# Patient Record
Sex: FEMALE | Race: BLACK OR AFRICAN AMERICAN | NOT HISPANIC OR LATINO | Employment: UNEMPLOYED | URBAN - METROPOLITAN AREA
[De-identification: names, ages, dates, MRNs, and addresses within clinical notes are randomized per-mention and may not be internally consistent; named-entity substitution may affect disease eponyms.]

---
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
## 2024-02-29 ENCOUNTER — TELEPHONE (OUTPATIENT)
Dept: OPTOMETRY | Facility: CLINIC | Age: 19
End: 2024-02-29

## 2024-02-29 ENCOUNTER — TELEPHONE (OUTPATIENT)
Dept: OPTOMETRY | Facility: CLINIC | Age: 19
End: 2024-02-29
Payer: COMMERCIAL

## 2024-02-29 ENCOUNTER — NURSE TRIAGE (OUTPATIENT)
Dept: NURSING | Facility: CLINIC | Age: 19
End: 2024-02-29

## 2024-02-29 DIAGNOSIS — H10.32 ACUTE CONJUNCTIVITIS OF LEFT EYE, UNSPECIFIED ACUTE CONJUNCTIVITIS TYPE: Primary | ICD-10-CM

## 2024-02-29 PROCEDURE — 99207 PR NO CHARGE LOS: CPT | Performed by: OPTOMETRIST

## 2024-02-29 RX ORDER — POLYMYXIN B SULFATE AND TRIMETHOPRIM 1; 10000 MG/ML; [USP'U]/ML
1 SOLUTION OPHTHALMIC 4 TIMES DAILY
Qty: 10 ML | Refills: 0 | Status: SHIPPED | OUTPATIENT
Start: 2024-02-29

## 2024-02-29 NOTE — TELEPHONE ENCOUNTER
[facility] Call Center    Phone Message    May a detailed message be left on voicemail: yes     Reason for Call: Other: Patient is returning call from the clinic and asking to speak with the care team member that called her.  Best time to reach the patient is 12:30 PM and she asks for them to call her twice.  Best phone number for the patient is [phone].  Thank you!      Action Taken: Message routed to:  Other: [facility]     Travel Screening: Not Applicable

## 2024-02-29 NOTE — TELEPHONE ENCOUNTER
Caller reporting the following red-flag symptom(s): GOOPY EYE , SWOLLEN , RED EYE AND SLIGHT EYE PAIN     Per the system red-flag symptom policy, patient was instructed to:  speak with a Registered Nurse    Action:  Patient warm transferred to a Registered Nurse

## 2024-02-29 NOTE — TELEPHONE ENCOUNTER
Nurse triage encounter - LVM for pt to call us back. May need to see a different provider based on triage. Copy and paste triage call below.     Background:   Last seen on 4/20/22     Optometry/ [facility]/ [facility]. Patient prefers this location.   1. Encounter for examination of eyes and vision without abnormal findings Z01.00    2. Myopia of both eyes H52.13       Assessment: patient with LEFT eye infection/ yellow drainage and photophobia, blurred vision LEFT     Protocol Recommended Disposition:   See in office today   Reviewed with patient: IF NO AVAILABLE APPOINTMENTS:  You  may need to be seen in an Urgent Care Center.   Another choice is to go to the Emergency Department.   Recommendation:  Please advise/ call with appt time. Thank you

## 2024-02-29 NOTE — TELEPHONE ENCOUNTER
"Nurse Triage SBAR    Is this a 2nd Level Triage? YES, LICENSED PRACTITIONER REVIEW IS REQUIRED    Situation: LEFT eye drainage with photophobia and blurred vision  She is having redness LEFt eye, with yellow and gooey discharge   Her vision has changed thusly: notes when she drives she is photophobic and LEFT vision is blurry  -Glasses worn no contacts    Denies URI, fever, others in house with similar symptoms.     Background:  Last seen on 4/20/22    Optometry/ [facility]/ [facility]. Patient prefers this location.  1. Encounter for examination of eyes and vision without abnormal findings  Z01.00     2. Myopia of both eyes  H52.13        Assessment: patient with LEFT eye infection/ yellow drainage and photophobia, blurred vision LEFT    Protocol Recommended Disposition:   See in office today   Reviewed with patient: IF NO AVAILABLE APPOINTMENTS:  You  may need to be seen in an Urgent Care Center.   Another choice is to go to the Emergency Department.  Recommendation:  Please advise/ call with appt time    Routed to provider team> patient preference    Does the patient meet one of the following criteria for ADS visit consideration? No    Reason for Disposition   MODERATE eye pain (e.g., interferes with normal activities)   Looking at light causes MODERATE to SEVERE eye pain (i.e., photophobia)    Additional Information   Negative: Eye exposure to chemical or fumes   Negative: Redness of white of eye (sclera), but no pus or only a small amount of brief pus   Negative: SEVERE pain (e.g., excruciating)   Negative: Patient sounds very sick or weak to the triager    Answer Assessment - Initial Assessment Questions  1. EYE DISCHARGE: \"Is the discharge in one or both eyes?\" \"What color is it?\" \"How much is there?\" \"When did the discharge start?\"     LEFT, yellow, thick  2. REDNESS OF SCLERA: \"Is the redness in one or both eyes?\" \"When did the redness start?\"   REDNESS x 3 days, now discharge  3. EYELIDS: \"Are the eyelids red or " "swollen?\" If Yes, ask: \"How much?\"      No but feel tender  4. VISION: \"Is there any difficulty seeing clearly?\"   Photophobic with driving in the sun  5. PAIN:      - MODERATE (4-7): interferes with normal activities or awakens from    6. CONTACT LENS: \"Do you wear contacts?\"      NO glasses only  7. OTHER SYMPTOMS: \"Do you have any other symptoms?\" (e.g., fever, runny nose, cough)  Denies    Protocols used: Eye - Pus or [id number]    "

## 2024-03-01 ENCOUNTER — OFFICE VISIT (OUTPATIENT)
Dept: OPTOMETRY | Facility: CLINIC | Age: 19
End: 2024-03-01
Payer: COMMERCIAL

## 2024-03-01 DIAGNOSIS — H10.9 BACTERIAL CONJUNCTIVITIS OF LEFT EYE: Primary | ICD-10-CM

## 2024-03-01 PROCEDURE — 99213 OFFICE O/P EST LOW 20 MIN: CPT | Performed by: OPTOMETRIST

## 2024-03-01 ASSESSMENT — CONF VISUAL FIELD
OD_INFERIOR_NASAL_RESTRICTION: 0
OD_SUPERIOR_TEMPORAL_RESTRICTION: 0
OD_INFERIOR_TEMPORAL_RESTRICTION: 0
OS_INFERIOR_TEMPORAL_RESTRICTION: 0
OD_NORMAL: 1
OD_SUPERIOR_NASAL_RESTRICTION: 0
OS_SUPERIOR_NASAL_RESTRICTION: 0
OS_NORMAL: 1
OS_SUPERIOR_TEMPORAL_RESTRICTION: 0
OS_INFERIOR_NASAL_RESTRICTION: 0

## 2024-03-01 ASSESSMENT — SLIT LAMP EXAM - LIDS
COMMENTS: NORMAL
COMMENTS: NORMAL

## 2024-03-01 ASSESSMENT — VISUAL ACUITY
OS_PH_CC: 20/25
OS_CC+: +2
OD_CC+: -2
OS_CC: 20/30
METHOD: SNELLEN - LINEAR
OD_PH_CC: 20/25
OD_CC: 20/25
OD_PH_CC+: +2
CORRECTION_TYPE: GLASSES

## 2024-03-01 ASSESSMENT — EXTERNAL EXAM - LEFT EYE: OS_EXAM: NORMAL

## 2024-03-01 ASSESSMENT — CUP TO DISC RATIO
OS_RATIO: 0.2
OD_RATIO: 0.25

## 2024-03-01 ASSESSMENT — EXTERNAL EXAM - RIGHT EYE: OD_EXAM: NORMAL

## 2024-03-01 NOTE — LETTER
3/1/2024         RE: Tawny Gardner  [address]  [place] [address]        Dear Colleague,    Thank you for referring your patient, Tawny Gardner, to [facility]. Please see a copy of my visit note below.    Chief Complaint   Patient presents with     Eye Pain     Eye Pain    In both eyes - L>R. Characterized as sharp pain while sleeping/laying down. Occurring intermittently. Duration of 1 week. Associated symptoms include discharge, redness (left eye only - started yesterday), itching, and tearing. Treatments tried include warm compresses (has only used a few times yesterday and today). Dr. Krishnan prescribed Polytrim drops to be used 4x/day in left eye but pt has not picked up drops yet.      Do you wear contact lenses? No      Dilia Dallas  Optometry Assistant     See Review Of Systems       Medical, surgical and family histories reviewed and updated 3/1/2024.         OBJECTIVE: See Ophthalmology exam    ASSESSMENT:    ICD-10-CM    1. Bacterial conjunctivitis of left eye  H10.9          PLAN:    Patient Instructions   Begin Polytrim eyedrops 4x daily in the left eye for 7 days, or until the eye feels back to normal.   OK to use in the right eye if you think symptoms are starting there, too.     Notify me with any further concerns.       Jose Krishnan, ADOLPH  [facility]  [address] [place] [address]. [place]  [id number] [phone]        Again, thank you for allowing me to participate in the care of your patient.        Sincerely,        Jose Krishnan, ADOLPH

## 2024-03-01 NOTE — PATIENT INSTRUCTIONS
Begin Polytrim eyedrops 4x daily in the left eye for 7 days, or until the eye feels back to normal.   OK to use in the right eye if you think symptoms are starting there, too.     Notify me with any further concerns.       Jose Krishnan, OD  [facility]  [address] [place] [address]. [place]  [address]    [phone]

## 2024-03-01 NOTE — PROGRESS NOTES
Chief Complaint   Patient presents with    Eye Pain     Eye Pain    In both eyes - L>R. Characterized as sharp pain while sleeping/laying down. Occurring intermittently. Duration of 1 week. Associated symptoms include discharge, redness (left eye only - started yesterday), itching, and tearing. Treatments tried include warm compresses (has only used a few times yesterday and today). Dr. Krishnan prescribed Polytrim drops to be used 4x/day in left eye but pt has not picked up drops yet.      Do you wear contact lenses? No      Dilia Dallas  Optometry Assistant     See Review Of Systems       Medical, surgical and family histories reviewed and updated 3/1/2024.         OBJECTIVE: See Ophthalmology exam    ASSESSMENT:    ICD-10-CM    1. Bacterial conjunctivitis of left eye  H10.9          PLAN:    Patient Instructions   Begin Polytrim eyedrops 4x daily in the left eye for 7 days, or until the eye feels back to normal.   OK to use in the right eye if you think symptoms are starting there, too.     Notify me with any further concerns.       Jose Krishnan, OD  [facility]  [address] [place] [address]. [place]  [address]    [phone]

## (undated) DEVICE — LINEN FULL SHEET 5511

## (undated) DEVICE — BAG CLEAR TRASH 1.3M 39X33" P4040C

## (undated) DEVICE — Device

## (undated) DEVICE — SOL NACL 0.9% IRRIG 1000ML BOTTLE 2F7124

## (undated) DEVICE — ESU GROUND PAD ADULT W/CORD E7507

## (undated) DEVICE — ENDO TROCAR SLEEVE KII Z-THREADED 05X100MM CTS02

## (undated) DEVICE — SU VICRYL 4-0 PS-2 18" UND J496H

## (undated) DEVICE — SUCTION CANISTER MEDIVAC LINER 3000ML W/LID 65651-530

## (undated) DEVICE — GOWN IMPERVIOUS ZONED XLG 9041

## (undated) DEVICE — ADH SKIN CLOSURE PREMIERPRO EXOFIN MICOR HV 0.5ML 3471

## (undated) DEVICE — LINEN TOWEL PACK X10 5473

## (undated) DEVICE — SU VICRYL 0 UR-6 27" J603H

## (undated) DEVICE — SUCTION IRR STRYKERFLOW II W/TIP 250-070-520

## (undated) DEVICE — STPL ENDO RELOAD 45MM VASCULAR X-THIN GRAY EGIA45AV

## (undated) DEVICE — STPL ENDO RELOAD 45MM VASCULAR MEDIUM TAN EGIA45AVM

## (undated) DEVICE — ESU ELEC BLADE 2.75" COATED/INSULATED E1455

## (undated) DEVICE — GLOVE PROTEXIS W/NEU-THERA 6.5  2D73TE65

## (undated) DEVICE — SOL NACL 0.9% IRRIG 3000ML BAG 07972-08

## (undated) DEVICE — ENDO TROCAR FIRST ENTRY KII FIOS Z-THRD 05X100MM CTF03

## (undated) DEVICE — NDL 22GA 1.5"

## (undated) DEVICE — ENDO POUCH UNIV RETRIEVAL SYSTEM INZII 10MM CD001

## (undated) DEVICE — LINEN HALF SHEET 5512

## (undated) DEVICE — ENDO TROCAR BLUNT TIP KII BALLOON 12X100MM C0R47

## (undated) DEVICE — ESU PENCIL W/HOLSTER E2350H

## (undated) DEVICE — LINEN POUCH DBL 5427

## (undated) DEVICE — STPL ENDO HANDLE GIA ULTRA UNIVERSAL STD EGIAUSTND

## (undated) DEVICE — ESU CORD MONOPOLAR 10'  E0510

## (undated) DEVICE — GLOVE PROTEXIS BLUE W/NEU-THERA 6.5  2D73EB65

## (undated) RX ORDER — FENTANYL CITRATE 50 UG/ML
INJECTION, SOLUTION INTRAMUSCULAR; INTRAVENOUS
Status: DISPENSED
Start: 2019-09-14